# Patient Record
Sex: MALE | Race: WHITE
[De-identification: names, ages, dates, MRNs, and addresses within clinical notes are randomized per-mention and may not be internally consistent; named-entity substitution may affect disease eponyms.]

---

## 2021-01-14 ENCOUNTER — HOSPITAL ENCOUNTER (OUTPATIENT)
Dept: HOSPITAL 79 - KOH-I | Age: 51
End: 2021-01-14
Attending: PODIATRIST
Payer: MEDICARE

## 2021-01-14 DIAGNOSIS — M14.671: Primary | ICD-10-CM

## 2021-01-14 DIAGNOSIS — M14.672: ICD-10-CM

## 2021-01-14 DIAGNOSIS — R93.6: ICD-10-CM

## 2021-01-14 DIAGNOSIS — Z89.421: ICD-10-CM

## 2021-01-14 DIAGNOSIS — Z89.412: ICD-10-CM

## 2021-01-14 DIAGNOSIS — R60.0: ICD-10-CM

## 2021-01-21 ENCOUNTER — HOSPITAL ENCOUNTER (OUTPATIENT)
Dept: HOSPITAL 79 - KOH-I | Age: 51
End: 2021-01-21
Attending: PODIATRIST
Payer: MEDICARE
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
## 2021-01-21 DIAGNOSIS — S92.331A: ICD-10-CM

## 2021-01-21 DIAGNOSIS — S92.321A: ICD-10-CM

## 2021-01-21 DIAGNOSIS — Z89.421: ICD-10-CM

## 2021-01-21 DIAGNOSIS — M86.9: ICD-10-CM

## 2021-01-21 DIAGNOSIS — L02.611: ICD-10-CM

## 2021-01-21 DIAGNOSIS — M14.671: Primary | ICD-10-CM

## 2021-01-25 ENCOUNTER — HOSPITAL ENCOUNTER (INPATIENT)
Dept: HOSPITAL 79 - M/S | Age: 51
LOS: 4 days | Discharge: HOME HEALTH | DRG: 617 | End: 2021-01-29
Attending: HOSPITALIST | Admitting: INTERNAL MEDICINE
Payer: MEDICARE

## 2021-01-25 VITALS — WEIGHT: 238 LBS | BODY MASS INDEX: 35.25 KG/M2 | HEIGHT: 69 IN

## 2021-01-25 DIAGNOSIS — S92.332A: ICD-10-CM

## 2021-01-25 DIAGNOSIS — Z89.422: ICD-10-CM

## 2021-01-25 DIAGNOSIS — E78.5: ICD-10-CM

## 2021-01-25 DIAGNOSIS — T81.41XA: ICD-10-CM

## 2021-01-25 DIAGNOSIS — E11.42: ICD-10-CM

## 2021-01-25 DIAGNOSIS — Z89.412: ICD-10-CM

## 2021-01-25 DIAGNOSIS — L89.892: ICD-10-CM

## 2021-01-25 DIAGNOSIS — Z23: ICD-10-CM

## 2021-01-25 DIAGNOSIS — I12.9: ICD-10-CM

## 2021-01-25 DIAGNOSIS — Z89.421: ICD-10-CM

## 2021-01-25 DIAGNOSIS — N18.9: ICD-10-CM

## 2021-01-25 DIAGNOSIS — I10: ICD-10-CM

## 2021-01-25 DIAGNOSIS — B95.62: ICD-10-CM

## 2021-01-25 DIAGNOSIS — F32.9: ICD-10-CM

## 2021-01-25 DIAGNOSIS — S92.322A: ICD-10-CM

## 2021-01-25 DIAGNOSIS — M86.9: ICD-10-CM

## 2021-01-25 DIAGNOSIS — I96: ICD-10-CM

## 2021-01-25 DIAGNOSIS — E11.52: ICD-10-CM

## 2021-01-25 DIAGNOSIS — L03.116: ICD-10-CM

## 2021-01-25 DIAGNOSIS — E11.22: ICD-10-CM

## 2021-01-25 DIAGNOSIS — E11.69: Primary | ICD-10-CM

## 2021-01-25 LAB
BUN/CREATININE RATIO: 16 (ref 0–10)
HGB BLD-MCNC: 12.8 GM/DL (ref 14–17.5)
RED BLOOD COUNT: 4.88 M/UL (ref 4.2–5.5)
WHITE BLOOD COUNT: 11.3 K/UL (ref 4.5–11)

## 2021-01-25 PROCEDURE — 3E02340 INTRODUCTION OF INFLUENZA VACCINE INTO MUSCLE, PERCUTANEOUS APPROACH: ICD-10-PCS | Performed by: INTERNAL MEDICINE

## 2021-01-25 PROCEDURE — G0008 ADMIN INFLUENZA VIRUS VAC: HCPCS

## 2021-01-25 PROCEDURE — U0002 COVID-19 LAB TEST NON-CDC: HCPCS

## 2021-01-27 LAB
BUN/CREATININE RATIO: 18 (ref 0–10)
HGB BLD-MCNC: 11.8 GM/DL (ref 14–17.5)
RED BLOOD COUNT: 4.62 M/UL (ref 4.2–5.5)
WHITE BLOOD COUNT: 9.2 K/UL (ref 4.5–11)

## 2021-01-27 PROCEDURE — 0Y6U0Z3 DETACHMENT AT LEFT 3RD TOE, LOW, OPEN APPROACH: ICD-10-PCS | Performed by: PODIATRIST

## 2021-01-27 PROCEDURE — 0QTP0ZZ RESECTION OF LEFT METATARSAL, OPEN APPROACH: ICD-10-PCS | Performed by: PODIATRIST

## 2021-01-27 PROCEDURE — 0JDR0ZZ EXTRACTION OF LEFT FOOT SUBCUTANEOUS TISSUE AND FASCIA, OPEN APPROACH: ICD-10-PCS | Performed by: PODIATRIST

## 2021-01-27 PROCEDURE — 0JBQ0ZZ EXCISION OF RIGHT FOOT SUBCUTANEOUS TISSUE AND FASCIA, OPEN APPROACH: ICD-10-PCS | Performed by: PODIATRIST

## 2021-01-27 PROCEDURE — 0Y6S0Z3 DETACHMENT AT LEFT 2ND TOE, LOW, OPEN APPROACH: ICD-10-PCS | Performed by: PODIATRIST

## 2021-01-27 PROCEDURE — 0HRMXK3 REPLACEMENT OF RIGHT FOOT SKIN WITH NONAUTOLOGOUS TISSUE SUBSTITUTE, FULL THICKNESS, EXTERNAL APPROACH: ICD-10-PCS | Performed by: PODIATRIST

## 2021-01-27 PROCEDURE — 0HRNXK3 REPLACEMENT OF LEFT FOOT SKIN WITH NONAUTOLOGOUS TISSUE SUBSTITUTE, FULL THICKNESS, EXTERNAL APPROACH: ICD-10-PCS | Performed by: PODIATRIST

## 2021-01-28 NOTE — NUR
Single lumen PICC line inserted in the left basilic vein, cut to 47cm.
Difficulty advancing PICC line initially.  Aspirates and flushes well.

## 2021-01-29 LAB
BUN/CREATININE RATIO: 15 (ref 0–10)
HGB BLD-MCNC: 10.5 GM/DL (ref 14–17.5)
RED BLOOD COUNT: 4.18 M/UL (ref 4.2–5.5)
WHITE BLOOD COUNT: 9.3 K/UL (ref 4.5–11)

## 2021-02-02 ENCOUNTER — HOSPITAL ENCOUNTER (OUTPATIENT)
Dept: HOSPITAL 79 - LAB | Age: 51
End: 2021-02-02
Attending: PODIATRIST
Payer: MEDICARE

## 2021-02-02 DIAGNOSIS — M86.9: Primary | ICD-10-CM

## 2021-02-02 DIAGNOSIS — R79.89: ICD-10-CM

## 2021-02-02 DIAGNOSIS — Z51.81: ICD-10-CM

## 2021-02-02 DIAGNOSIS — Z79.899: ICD-10-CM

## 2021-02-02 DIAGNOSIS — R68.89: ICD-10-CM

## 2021-02-02 LAB
BUN/CREATININE RATIO: 14 (ref 0–10)
HGB BLD-MCNC: 11.2 GM/DL (ref 14–17.5)
RED BLOOD COUNT: 4.34 M/UL (ref 4.2–5.5)
WHITE BLOOD COUNT: 8.4 K/UL (ref 4.5–11)

## 2021-02-03 LAB
CASTS URNS QL MICRO: 1 NG/ML
HYDROCODONE: 13.7 NG/ML

## 2021-02-08 ENCOUNTER — HOSPITAL ENCOUNTER (OUTPATIENT)
Dept: HOSPITAL 79 - VNA | Age: 51
End: 2021-02-08
Attending: PODIATRIST
Payer: MEDICARE

## 2021-02-08 DIAGNOSIS — Z79.2: ICD-10-CM

## 2021-02-08 DIAGNOSIS — R79.89: ICD-10-CM

## 2021-02-08 DIAGNOSIS — R79.82: ICD-10-CM

## 2021-02-08 DIAGNOSIS — R68.89: ICD-10-CM

## 2021-02-08 DIAGNOSIS — S91.302A: ICD-10-CM

## 2021-02-08 DIAGNOSIS — Z51.81: Primary | ICD-10-CM

## 2021-02-08 DIAGNOSIS — R70.0: ICD-10-CM

## 2021-02-08 LAB
BUN/CREATININE RATIO: 15 (ref 0–10)
HGB BLD-MCNC: 11.4 GM/DL (ref 14–17.5)
RED BLOOD COUNT: 4.39 M/UL (ref 4.2–5.5)
WHITE BLOOD COUNT: 6.5 K/UL (ref 4.5–11)

## 2021-02-28 ENCOUNTER — HOSPITAL ENCOUNTER (INPATIENT)
Dept: HOSPITAL 79 - ER1 | Age: 51
LOS: 3 days | Discharge: HOME | DRG: 683 | End: 2021-03-03
Attending: STUDENT IN AN ORGANIZED HEALTH CARE EDUCATION/TRAINING PROGRAM | Admitting: EMERGENCY MEDICINE
Payer: MEDICARE

## 2021-02-28 VITALS — HEIGHT: 69 IN | BODY MASS INDEX: 34.8 KG/M2 | WEIGHT: 235 LBS

## 2021-02-28 DIAGNOSIS — Z79.899: ICD-10-CM

## 2021-02-28 DIAGNOSIS — E86.1: ICD-10-CM

## 2021-02-28 DIAGNOSIS — I10: ICD-10-CM

## 2021-02-28 DIAGNOSIS — Z80.1: ICD-10-CM

## 2021-02-28 DIAGNOSIS — Z85.820: ICD-10-CM

## 2021-02-28 DIAGNOSIS — F43.10: ICD-10-CM

## 2021-02-28 DIAGNOSIS — F32.9: ICD-10-CM

## 2021-02-28 DIAGNOSIS — Z89.432: ICD-10-CM

## 2021-02-28 DIAGNOSIS — I95.9: ICD-10-CM

## 2021-02-28 DIAGNOSIS — E11.69: ICD-10-CM

## 2021-02-28 DIAGNOSIS — M86.8X7: ICD-10-CM

## 2021-02-28 DIAGNOSIS — H54.61: ICD-10-CM

## 2021-02-28 DIAGNOSIS — E78.5: ICD-10-CM

## 2021-02-28 DIAGNOSIS — R55: ICD-10-CM

## 2021-02-28 DIAGNOSIS — E11.40: ICD-10-CM

## 2021-02-28 DIAGNOSIS — Z89.421: ICD-10-CM

## 2021-02-28 DIAGNOSIS — Z88.2: ICD-10-CM

## 2021-02-28 DIAGNOSIS — B95.62: ICD-10-CM

## 2021-02-28 DIAGNOSIS — E87.2: ICD-10-CM

## 2021-02-28 DIAGNOSIS — K52.9: ICD-10-CM

## 2021-02-28 DIAGNOSIS — J30.9: ICD-10-CM

## 2021-02-28 DIAGNOSIS — N17.9: Primary | ICD-10-CM

## 2021-02-28 DIAGNOSIS — E11.65: ICD-10-CM

## 2021-02-28 DIAGNOSIS — Z90.49: ICD-10-CM

## 2021-02-28 DIAGNOSIS — F41.9: ICD-10-CM

## 2021-02-28 DIAGNOSIS — E86.0: ICD-10-CM

## 2021-02-28 DIAGNOSIS — Z79.4: ICD-10-CM

## 2021-02-28 DIAGNOSIS — K21.9: ICD-10-CM

## 2021-02-28 LAB
BUN/CREATININE RATIO: 17 (ref 0–10)
HGB BLD-MCNC: 14.7 GM/DL (ref 14–17.5)
RED BLOOD COUNT: 5.51 M/UL (ref 4.2–5.5)
WHITE BLOOD COUNT: 15.6 K/UL (ref 4.5–11)

## 2021-02-28 PROCEDURE — A6212 FOAM DRG <=16 SQ IN W/BORDER: HCPCS

## 2021-02-28 PROCEDURE — U0002 COVID-19 LAB TEST NON-CDC: HCPCS

## 2021-03-01 LAB
BUN/CREATININE RATIO: 15 (ref 0–10)
HGB BLD-MCNC: 11.9 GM/DL (ref 14–17.5)
RED BLOOD COUNT: 4.54 M/UL (ref 4.2–5.5)
WHITE BLOOD COUNT: 9.5 K/UL (ref 4.5–11)

## 2021-03-02 LAB
BUN/CREATININE RATIO: 17 (ref 0–10)
HGB BLD-MCNC: 10.9 GM/DL (ref 14–17.5)
RED BLOOD COUNT: 4.19 M/UL (ref 4.2–5.5)
WHITE BLOOD COUNT: 6.2 K/UL (ref 4.5–11)

## 2021-03-03 LAB — BUN/CREATININE RATIO: 11 (ref 0–10)

## 2021-04-11 ENCOUNTER — HOSPITAL ENCOUNTER (INPATIENT)
Dept: HOSPITAL 79 - ER1 | Age: 51
LOS: 3 days | Discharge: HOME HEALTH | DRG: 493 | End: 2021-04-14
Attending: INTERNAL MEDICINE | Admitting: EMERGENCY MEDICINE
Payer: MEDICARE

## 2021-04-11 VITALS — WEIGHT: 247 LBS | BODY MASS INDEX: 36.58 KG/M2 | HEIGHT: 69 IN

## 2021-04-11 DIAGNOSIS — Z79.4: ICD-10-CM

## 2021-04-11 DIAGNOSIS — I45.81: ICD-10-CM

## 2021-04-11 DIAGNOSIS — M86.672: ICD-10-CM

## 2021-04-11 DIAGNOSIS — Z80.1: ICD-10-CM

## 2021-04-11 DIAGNOSIS — K21.9: ICD-10-CM

## 2021-04-11 DIAGNOSIS — Z20.822: ICD-10-CM

## 2021-04-11 DIAGNOSIS — Z98.1: ICD-10-CM

## 2021-04-11 DIAGNOSIS — E78.5: ICD-10-CM

## 2021-04-11 DIAGNOSIS — Z90.3: ICD-10-CM

## 2021-04-11 DIAGNOSIS — Z88.1: ICD-10-CM

## 2021-04-11 DIAGNOSIS — Z87.891: ICD-10-CM
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
## 2021-04-11 DIAGNOSIS — E66.9: ICD-10-CM

## 2021-04-11 DIAGNOSIS — E11.42: ICD-10-CM
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
## 2021-04-11 DIAGNOSIS — C69.90: ICD-10-CM

## 2021-04-11 DIAGNOSIS — F41.9: ICD-10-CM

## 2021-04-11 DIAGNOSIS — I10: ICD-10-CM

## 2021-04-11 DIAGNOSIS — E11.40: ICD-10-CM

## 2021-04-11 DIAGNOSIS — E11.610: ICD-10-CM

## 2021-04-11 DIAGNOSIS — S82.852A: Primary | ICD-10-CM
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
## 2021-04-11 DIAGNOSIS — F32.9: ICD-10-CM

## 2021-04-11 DIAGNOSIS — Z89.422: ICD-10-CM

## 2021-04-11 DIAGNOSIS — N99.0: ICD-10-CM

## 2021-04-11 DIAGNOSIS — R33.9: ICD-10-CM

## 2021-04-11 LAB
BUN/CREATININE RATIO: 8 (ref 0–10)
HGB BLD-MCNC: 12.3 GM/DL (ref 14–17.5)
RED BLOOD COUNT: 4.67 M/UL (ref 4.2–5.5)
WHITE BLOOD COUNT: 9.6 K/UL (ref 4.5–11)

## 2021-04-11 PROCEDURE — U0002 COVID-19 LAB TEST NON-CDC: HCPCS

## 2021-04-11 PROCEDURE — C1713 ANCHOR/SCREW BN/BN,TIS/BN: HCPCS

## 2021-04-12 PROCEDURE — 0QSH05Z REPOSITION LEFT TIBIA WITH EXTERNAL FIXATION DEVICE, OPEN APPROACH: ICD-10-PCS | Performed by: ORTHOPAEDIC SURGERY

## 2021-04-13 LAB
BUN/CREATININE RATIO: 9 (ref 0–10)
HGB BLD-MCNC: 10.7 GM/DL (ref 14–17.5)
RED BLOOD COUNT: 4.13 M/UL (ref 4.2–5.5)
WHITE BLOOD COUNT: 6.8 K/UL (ref 4.5–11)

## 2021-04-14 LAB
BUN/CREATININE RATIO: 12 (ref 0–10)
HGB BLD-MCNC: 9.3 GM/DL (ref 14–17.5)
RED BLOOD COUNT: 3.58 M/UL (ref 4.2–5.5)
WHITE BLOOD COUNT: 6.9 K/UL (ref 4.5–11)

## 2021-04-14 NOTE — NUR
[facility] WAS CALLED AT 1815 FOR REPORT, BUT THERE WAS NO ANSWER. PT
AWARE TO LET HH KNOW HE IS BACK HOME IN AM.

## 2021-04-30 ENCOUNTER — HOSPITAL ENCOUNTER (OUTPATIENT)
Dept: HOSPITAL 79 - OPSV2 | Age: 51
End: 2021-04-30
Attending: ORTHOPAEDIC SURGERY
Payer: MEDICARE

## 2021-04-30 DIAGNOSIS — S82.852A: ICD-10-CM

## 2021-04-30 DIAGNOSIS — Z01.810: Primary | ICD-10-CM
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
## 2021-05-02 ENCOUNTER — HOSPITAL ENCOUNTER (OUTPATIENT)
Dept: HOSPITAL 79 - LAB | Age: 51
End: 2021-05-02
Attending: FAMILY MEDICINE
Payer: MEDICARE

## 2021-05-02 DIAGNOSIS — S82.852A: ICD-10-CM
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
## 2021-05-02 DIAGNOSIS — Z01.812: Primary | ICD-10-CM

## 2021-05-03 ENCOUNTER — HOSPITAL ENCOUNTER (OUTPATIENT)
Dept: HOSPITAL 79 - M/S | Age: 51
LOS: 3 days | Discharge: HOME HEALTH | End: 2021-05-06
Attending: ORTHOPAEDIC SURGERY
Payer: MEDICARE

## 2021-05-03 VITALS — WEIGHT: 240 LBS | HEIGHT: 69 IN | BODY MASS INDEX: 35.55 KG/M2

## 2021-05-03 DIAGNOSIS — E78.5: ICD-10-CM

## 2021-05-03 DIAGNOSIS — G89.18: ICD-10-CM

## 2021-05-03 DIAGNOSIS — E07.9: ICD-10-CM

## 2021-05-03 DIAGNOSIS — E11.65: ICD-10-CM

## 2021-05-03 DIAGNOSIS — Z88.8: ICD-10-CM

## 2021-05-03 DIAGNOSIS — Z91.19: ICD-10-CM

## 2021-05-03 DIAGNOSIS — Z96.698: ICD-10-CM

## 2021-05-03 DIAGNOSIS — Z88.0: ICD-10-CM

## 2021-05-03 DIAGNOSIS — E11.42: ICD-10-CM

## 2021-05-03 DIAGNOSIS — I12.9: ICD-10-CM

## 2021-05-03 DIAGNOSIS — E11.22: ICD-10-CM

## 2021-05-03 DIAGNOSIS — M86.672: ICD-10-CM

## 2021-05-03 DIAGNOSIS — N18.9: ICD-10-CM

## 2021-05-03 DIAGNOSIS — F41.1: ICD-10-CM

## 2021-05-03 DIAGNOSIS — K21.9: ICD-10-CM

## 2021-05-03 DIAGNOSIS — Z87.891: ICD-10-CM

## 2021-05-03 DIAGNOSIS — Z88.6: ICD-10-CM

## 2021-05-03 DIAGNOSIS — R94.31: ICD-10-CM

## 2021-05-03 DIAGNOSIS — Z89.432: ICD-10-CM

## 2021-05-03 DIAGNOSIS — F32.9: ICD-10-CM

## 2021-05-03 DIAGNOSIS — N17.9: ICD-10-CM

## 2021-05-03 DIAGNOSIS — E11.69: Primary | ICD-10-CM

## 2021-05-03 DIAGNOSIS — Z79.2: ICD-10-CM

## 2021-05-03 DIAGNOSIS — Z95.5: ICD-10-CM

## 2021-05-03 DIAGNOSIS — Z88.1: ICD-10-CM

## 2021-05-03 DIAGNOSIS — Z85.820: ICD-10-CM

## 2021-05-03 PROCEDURE — 3E0T3BZ INTRODUCTION OF ANESTHETIC AGENT INTO PERIPHERAL NERVES AND PLEXI, PERCUTANEOUS APPROACH: ICD-10-PCS | Performed by: ORTHOPAEDIC SURGERY

## 2021-05-03 PROCEDURE — 0Y6J0Z2 DETACHMENT AT LEFT LOWER LEG, MID, OPEN APPROACH: ICD-10-PCS | Performed by: ORTHOPAEDIC SURGERY

## 2021-05-04 LAB
BUN/CREATININE RATIO: 27 (ref 0–10)
HGB BLD-MCNC: 9.8 GM/DL (ref 14–17.5)
RED BLOOD COUNT: 3.86 M/UL (ref 4.2–5.5)
WHITE BLOOD COUNT: 10.6 K/UL (ref 4.5–11)

## 2021-05-05 LAB
BUN/CREATININE RATIO: 24 (ref 0–10)
HGB BLD-MCNC: 9.4 GM/DL (ref 14–17.5)
RED BLOOD COUNT: 3.61 M/UL (ref 4.2–5.5)
WHITE BLOOD COUNT: 9.4 K/UL (ref 4.5–11)

## 2021-05-06 LAB
BUN/CREATININE RATIO: 30 (ref 0–10)
HGB BLD-MCNC: 9.3 GM/DL (ref 14–17.5)
RED BLOOD COUNT: 3.59 M/UL (ref 4.2–5.5)
WHITE BLOOD COUNT: 6.8 K/UL (ref 4.5–11)

## 2021-05-17 ENCOUNTER — HOSPITAL ENCOUNTER (OUTPATIENT)
Dept: HOSPITAL 79 - NM | Age: 51
End: 2021-05-17
Attending: INTERNAL MEDICINE
Payer: MEDICARE

## 2021-05-17 DIAGNOSIS — R07.9: Primary | ICD-10-CM

## 2021-05-17 DIAGNOSIS — R55: ICD-10-CM

## 2021-05-17 DIAGNOSIS — R93.1: ICD-10-CM

## 2021-05-17 DIAGNOSIS — I51.7: ICD-10-CM

## 2021-05-17 DIAGNOSIS — R94.39: ICD-10-CM

## 2021-05-17 PROCEDURE — A9502 TC99M TETROFOSMIN: HCPCS

## 2022-02-17 ENCOUNTER — HOSPITAL ENCOUNTER (OUTPATIENT)
Dept: HOSPITAL 79 - KOH-I | Age: 52
End: 2022-02-17
Attending: PODIATRIST
Payer: MEDICARE

## 2022-02-17 DIAGNOSIS — M25.571: Primary | ICD-10-CM

## 2022-02-17 DIAGNOSIS — Z96.698: ICD-10-CM

## 2022-02-17 DIAGNOSIS — M79.89: ICD-10-CM

## 2022-09-07 ENCOUNTER — HOSPITAL ENCOUNTER (OUTPATIENT)
Dept: HOSPITAL 79 - KOH-I | Age: 52
End: 2022-09-07
Payer: MEDICARE

## 2022-09-07 DIAGNOSIS — M17.0: Primary | ICD-10-CM

## 2022-09-07 DIAGNOSIS — Z89.512: ICD-10-CM
